# Patient Record
Sex: FEMALE | URBAN - METROPOLITAN AREA
[De-identification: names, ages, dates, MRNs, and addresses within clinical notes are randomized per-mention and may not be internally consistent; named-entity substitution may affect disease eponyms.]

---

## 2020-12-02 ENCOUNTER — APPOINTMENT (RX ONLY)
Dept: URBAN - METROPOLITAN AREA CLINIC 174 | Facility: CLINIC | Age: 75
Setting detail: DERMATOLOGY
End: 2020-12-02

## 2020-12-02 DIAGNOSIS — L28.0 LICHEN SIMPLEX CHRONICUS: ICD-10-CM

## 2020-12-02 DIAGNOSIS — D69.2 OTHER NONTHROMBOCYTOPENIC PURPURA: ICD-10-CM

## 2020-12-02 PROCEDURE — ? PRESCRIPTION

## 2020-12-02 PROCEDURE — 99202 OFFICE O/P NEW SF 15 MIN: CPT

## 2020-12-02 PROCEDURE — ? COUNSELING

## 2020-12-02 RX ORDER — CLOBETASOL PROPIONATE 0.5 MG/G
1 CREAM TOPICAL BID
Qty: 1 | Refills: 1 | Status: ERX

## 2020-12-02 ASSESSMENT — LOCATION DETAILED DESCRIPTION DERM
LOCATION DETAILED: MID POSTERIOR NECK
LOCATION DETAILED: LEFT MEDIAL BREAST 9-10:00 REGION

## 2020-12-02 ASSESSMENT — LOCATION ZONE DERM
LOCATION ZONE: NECK
LOCATION ZONE: TRUNK

## 2020-12-02 ASSESSMENT — LOCATION SIMPLE DESCRIPTION DERM
LOCATION SIMPLE: LEFT BREAST
LOCATION SIMPLE: POSTERIOR NECK

## 2020-12-02 NOTE — HPI: SKIN LESION
How Severe Is Your Skin Lesion?: mild
Has Your Skin Lesion Been Treated?: not been treated
Is This A New Presentation, Or A Follow-Up?: Skin Lesion
Additional History: Unsure if this is from fall she had 3 weeks ago.

## 2020-12-02 NOTE — HPI: RASH
How Severe Is Your Rash?: moderate
Is This A New Presentation, Or A Follow-Up?: Rash
Additional History: Patient had steroid injections, last one was 1 year ago (candice from [place])

## 2023-09-27 ENCOUNTER — APPOINTMENT (RX ONLY)
Dept: URBAN - METROPOLITAN AREA CLINIC 174 | Facility: CLINIC | Age: 78
Setting detail: DERMATOLOGY
End: 2023-09-27

## 2023-09-27 DIAGNOSIS — L29.89 OTHER PRURITUS: ICD-10-CM

## 2023-09-27 DIAGNOSIS — L0293 CARBUNCLE AND FURUNCLE OF UNSPECIFIED SITE: ICD-10-CM

## 2023-09-27 DIAGNOSIS — L0292 CARBUNCLE AND FURUNCLE OF UNSPECIFIED SITE: ICD-10-CM

## 2023-09-27 DIAGNOSIS — L29.8 OTHER PRURITUS: ICD-10-CM

## 2023-09-27 PROBLEM — L02.233 CARBUNCLE OF CHEST WALL: Status: ACTIVE | Noted: 2023-09-27

## 2023-09-27 PROCEDURE — 99213 OFFICE O/P EST LOW 20 MIN: CPT

## 2023-09-27 PROCEDURE — ? COUNSELING

## 2023-09-27 PROCEDURE — ? PRESCRIPTION

## 2023-09-27 PROCEDURE — ? PRESCRIPTION MEDICATION MANAGEMENT

## 2023-09-27 RX ORDER — DOXYCYCLINE HYCLATE 100 MG/1
1 CAPSULE, GELATIN COATED ORAL BID
Qty: 28 | Refills: 0 | Status: ERX | COMMUNITY
Start: 2023-09-27

## 2023-09-27 RX ADMIN — DOXYCYCLINE HYCLATE 1: 100 CAPSULE, GELATIN COATED ORAL at 00:00

## 2023-09-27 ASSESSMENT — LOCATION SIMPLE DESCRIPTION DERM
LOCATION SIMPLE: CHEST
LOCATION SIMPLE: RIGHT BREAST

## 2023-09-27 ASSESSMENT — LOCATION DETAILED DESCRIPTION DERM
LOCATION DETAILED: RIGHT PERIAREOLAR BREAST 11-12:00 REGION
LOCATION DETAILED: LEFT LATERAL SUPERIOR CHEST

## 2023-09-27 ASSESSMENT — LOCATION ZONE DERM: LOCATION ZONE: TRUNK

## 2023-09-27 ASSESSMENT — SEVERITY ASSESSMENT: SEVERITY: SEVERE

## 2023-09-27 NOTE — HPI: EVALUATION OF SKIN LESION(S)
What Type Of Note Output Would You Prefer (Optional)?: Standard Output
How Severe Are Your Spot(S)?: severe
Have Your Spot(S) Been Treated In The Past?: has been treated
Hpi Title: Evaluation of a Skin Lesion

## 2023-09-27 NOTE — PROCEDURE: PRESCRIPTION MEDICATION MANAGEMENT
Discontinue Regimen: Tacrolimus given by allergist
Plan: Can use a triple antibiotic if desired
Render In Strict Bullet Format?: No
Initiate Treatment: Doxycycline hyclate 100mg one pill twice a day
Detail Level: Zone
Plan: Patient can increase the antihistamine to 2 pills a day and use the tacrolimus to any itchy areas.

## 2023-10-25 ENCOUNTER — APPOINTMENT (RX ONLY)
Dept: URBAN - METROPOLITAN AREA CLINIC 174 | Facility: CLINIC | Age: 78
Setting detail: DERMATOLOGY
End: 2023-10-25

## 2023-10-25 DIAGNOSIS — L0292 CARBUNCLE AND FURUNCLE OF UNSPECIFIED SITE: ICD-10-CM | Status: IMPROVED

## 2023-10-25 DIAGNOSIS — L0293 CARBUNCLE AND FURUNCLE OF UNSPECIFIED SITE: ICD-10-CM | Status: IMPROVED

## 2023-10-25 DIAGNOSIS — L03.01 CELLULITIS OF FINGER: ICD-10-CM

## 2023-10-25 DIAGNOSIS — L29.89 OTHER PRURITUS: ICD-10-CM | Status: IMPROVED

## 2023-10-25 DIAGNOSIS — L29.8 OTHER PRURITUS: ICD-10-CM | Status: IMPROVED

## 2023-10-25 DIAGNOSIS — L57.8 OTHER SKIN CHANGES DUE TO CHRONIC EXPOSURE TO NONIONIZING RADIATION: ICD-10-CM

## 2023-10-25 PROBLEM — L03.012 CELLULITIS OF LEFT FINGER: Status: ACTIVE | Noted: 2023-10-25

## 2023-10-25 PROBLEM — L03.011 CELLULITIS OF RIGHT FINGER: Status: ACTIVE | Noted: 2023-10-25

## 2023-10-25 PROBLEM — L02.233 CARBUNCLE OF CHEST WALL: Status: ACTIVE | Noted: 2023-10-25

## 2023-10-25 PROCEDURE — ? PRESCRIPTION

## 2023-10-25 PROCEDURE — ? PRESCRIPTION MEDICATION MANAGEMENT

## 2023-10-25 PROCEDURE — 99213 OFFICE O/P EST LOW 20 MIN: CPT

## 2023-10-25 PROCEDURE — ? COUNSELING

## 2023-10-25 RX ORDER — MUPIROCIN 20 MG/G
OINTMENT TOPICAL
Qty: 22 | Refills: 1 | Status: ERX | COMMUNITY
Start: 2023-10-25

## 2023-10-25 RX ADMIN — MUPIROCIN: 20 OINTMENT TOPICAL at 00:00

## 2023-10-25 ASSESSMENT — LOCATION SIMPLE DESCRIPTION DERM
LOCATION SIMPLE: RIGHT THUMBNAIL
LOCATION SIMPLE: CHEST
LOCATION SIMPLE: LEFT MIDDLE FINGERNAIL
LOCATION SIMPLE: LEFT INDEX FINGER
LOCATION SIMPLE: RIGHT BREAST

## 2023-10-25 ASSESSMENT — LOCATION ZONE DERM
LOCATION ZONE: FINGER
LOCATION ZONE: FINGERNAIL
LOCATION ZONE: TRUNK

## 2023-10-25 ASSESSMENT — LOCATION DETAILED DESCRIPTION DERM
LOCATION DETAILED: RIGHT PERIAREOLAR BREAST 11-12:00 REGION
LOCATION DETAILED: RIGHT THUMBNAIL
LOCATION DETAILED: LEFT LATERAL SUPERIOR CHEST
LOCATION DETAILED: LEFT MIDDLE FINGERNAIL
LOCATION DETAILED: PERIUNGUAL SKIN LEFT INDEX FINGER

## 2023-10-25 NOTE — PROCEDURE: PRESCRIPTION MEDICATION MANAGEMENT
Discontinue Regimen: Doxycycline no longer needed.
Plan: Can use a triple antibiotic if desired
Render In Strict Bullet Format?: No
Initiate Treatment: Mupirocin 2% topical pint once daily to affected area.
Detail Level: Zone
Plan: Patient can increase the antihistamine to 2 pills a day and use the tacrolimus to any itchy areas.
Initiate Treatment: Apply mupirocin 2% topical ointment once daily as directed.

## 2023-11-08 ENCOUNTER — APPOINTMENT (RX ONLY)
Dept: URBAN - METROPOLITAN AREA CLINIC 174 | Facility: CLINIC | Age: 78
Setting detail: DERMATOLOGY
End: 2023-11-08

## 2023-11-08 DIAGNOSIS — L29.89 OTHER PRURITUS: ICD-10-CM | Status: IMPROVED

## 2023-11-08 DIAGNOSIS — L57.8 OTHER SKIN CHANGES DUE TO CHRONIC EXPOSURE TO NONIONIZING RADIATION: ICD-10-CM

## 2023-11-08 DIAGNOSIS — L60.8 OTHER NAIL DISORDERS: ICD-10-CM

## 2023-11-08 DIAGNOSIS — L29.8 OTHER PRURITUS: ICD-10-CM | Status: IMPROVED

## 2023-11-08 DIAGNOSIS — L03.01 CELLULITIS OF FINGER: ICD-10-CM

## 2023-11-08 DIAGNOSIS — L0292 CARBUNCLE AND FURUNCLE OF UNSPECIFIED SITE: ICD-10-CM | Status: IMPROVED

## 2023-11-08 DIAGNOSIS — L0293 CARBUNCLE AND FURUNCLE OF UNSPECIFIED SITE: ICD-10-CM | Status: IMPROVED

## 2023-11-08 PROBLEM — L03.011 CELLULITIS OF RIGHT FINGER: Status: ACTIVE | Noted: 2023-11-08

## 2023-11-08 PROBLEM — L02.233 CARBUNCLE OF CHEST WALL: Status: ACTIVE | Noted: 2023-11-08

## 2023-11-08 PROBLEM — L03.012 CELLULITIS OF LEFT FINGER: Status: ACTIVE | Noted: 2023-11-08

## 2023-11-08 PROCEDURE — ? PRESCRIPTION

## 2023-11-08 PROCEDURE — ? COUNSELING

## 2023-11-08 PROCEDURE — ? PRESCRIPTION MEDICATION MANAGEMENT

## 2023-11-08 PROCEDURE — 99213 OFFICE O/P EST LOW 20 MIN: CPT

## 2023-11-08 RX ORDER — DOXYCYCLINE HYCLATE 100 MG/1
1 CAPSULE, GELATIN COATED ORAL BID
Qty: 20 | Refills: 0 | Status: ERX

## 2023-11-08 RX ORDER — HYDROCORTISONE, IODOQUINOL 10; 10 MG/G; MG/G
1 CREAM TOPICAL QD
Qty: 28.4 | Refills: 3 | Status: ERX | COMMUNITY
Start: 2023-11-08

## 2023-11-08 RX ADMIN — HYDROCORTISONE, IODOQUINOL 1: 10; 10 CREAM TOPICAL at 00:00

## 2023-11-08 ASSESSMENT — LOCATION ZONE DERM
LOCATION ZONE: TRUNK
LOCATION ZONE: FINGER
LOCATION ZONE: FINGERNAIL

## 2023-11-08 ASSESSMENT — LOCATION DETAILED DESCRIPTION DERM
LOCATION DETAILED: RIGHT PERIAREOLAR BREAST 11-12:00 REGION
LOCATION DETAILED: LEFT LATERAL SUPERIOR CHEST
LOCATION DETAILED: PERIUNGUAL SKIN LEFT INDEX FINGER
LOCATION DETAILED: LEFT MIDDLE FINGERNAIL
LOCATION DETAILED: RIGHT THUMBNAIL

## 2023-11-08 ASSESSMENT — LOCATION SIMPLE DESCRIPTION DERM
LOCATION SIMPLE: RIGHT BREAST
LOCATION SIMPLE: LEFT INDEX FINGER
LOCATION SIMPLE: RIGHT THUMBNAIL
LOCATION SIMPLE: CHEST
LOCATION SIMPLE: LEFT MIDDLE FINGERNAIL

## 2023-11-08 NOTE — PROCEDURE: PRESCRIPTION MEDICATION MANAGEMENT
Discontinue Regimen: Doxycycline no longer needed.
Render In Strict Bullet Format?: No
Initiate Treatment: Mupirocin 2% topical once daily to affected area.
Detail Level: Zone
Initiate Treatment: Hydrocortisone-iodoquinol once a day
Continue Regimen: Apply mupirocin 2% topical ointment once daily as directed.
Plan: Patient can increase the antihistamine to 2 pills a day and use the tacrolimus to any itchy areas.
Plan: Discussed vinegar soaks but pt states she is severely allergic\\n\\nDiscussed this could be due to her hospital stay in February
Initiate Treatment: Doxycycline hyclate 100mg BID for 10 days

## 2023-12-06 ENCOUNTER — APPOINTMENT (RX ONLY)
Dept: URBAN - METROPOLITAN AREA CLINIC 174 | Facility: CLINIC | Age: 78
Setting detail: DERMATOLOGY
End: 2023-12-06

## 2023-12-06 DIAGNOSIS — L60.8 OTHER NAIL DISORDERS: ICD-10-CM

## 2023-12-06 DIAGNOSIS — L0293 CARBUNCLE AND FURUNCLE OF UNSPECIFIED SITE: ICD-10-CM | Status: RESOLVING

## 2023-12-06 DIAGNOSIS — L03.01 CELLULITIS OF FINGER: ICD-10-CM

## 2023-12-06 DIAGNOSIS — L0292 CARBUNCLE AND FURUNCLE OF UNSPECIFIED SITE: ICD-10-CM | Status: RESOLVING

## 2023-12-06 PROBLEM — L03.011 CELLULITIS OF RIGHT FINGER: Status: ACTIVE | Noted: 2023-12-06

## 2023-12-06 PROBLEM — L03.012 CELLULITIS OF LEFT FINGER: Status: ACTIVE | Noted: 2023-12-06

## 2023-12-06 PROBLEM — L02.233 CARBUNCLE OF CHEST WALL: Status: ACTIVE | Noted: 2023-12-06

## 2023-12-06 PROCEDURE — ? PRESCRIPTION

## 2023-12-06 PROCEDURE — ? ORDER TESTS

## 2023-12-06 PROCEDURE — ? COUNSELING

## 2023-12-06 PROCEDURE — ? PRESCRIPTION MEDICATION MANAGEMENT

## 2023-12-06 PROCEDURE — 99213 OFFICE O/P EST LOW 20 MIN: CPT

## 2023-12-06 RX ORDER — CLOBETASOL PROPIONATE 0.5 MG/G
1 CREAM TOPICAL AS DIRECTED
Qty: 60 | Refills: 1 | Status: ERX | COMMUNITY
Start: 2023-12-06

## 2023-12-06 RX ADMIN — CLOBETASOL PROPIONATE 1: 0.5 CREAM TOPICAL at 00:00

## 2023-12-06 ASSESSMENT — LOCATION DETAILED DESCRIPTION DERM
LOCATION DETAILED: RIGHT PERIAREOLAR BREAST 11-12:00 REGION
LOCATION DETAILED: LEFT MIDDLE FINGERNAIL
LOCATION DETAILED: RIGHT THUMBNAIL
LOCATION DETAILED: PERIUNGUAL SKIN LEFT INDEX FINGER

## 2023-12-06 ASSESSMENT — LOCATION ZONE DERM
LOCATION ZONE: FINGERNAIL
LOCATION ZONE: TRUNK
LOCATION ZONE: FINGER

## 2023-12-06 ASSESSMENT — LOCATION SIMPLE DESCRIPTION DERM
LOCATION SIMPLE: RIGHT THUMBNAIL
LOCATION SIMPLE: LEFT INDEX FINGER
LOCATION SIMPLE: RIGHT BREAST
LOCATION SIMPLE: LEFT MIDDLE FINGERNAIL

## 2023-12-06 NOTE — PROCEDURE: ORDER TESTS
Bill For Surgical Tray: no
Billing Type: Third-Party Bill
Expected Date Of Service: 12/06/2023
Performing Laboratory: [id number]
Lab Facility: [id number]

## 2023-12-06 NOTE — PROCEDURE: PRESCRIPTION MEDICATION MANAGEMENT
Plan: Discussed vinegar soaks but pt states she is severely allergic\\n\\nDiscussed this could be due to her hospital stay in February
Detail Level: Zone
Render In Strict Bullet Format?: No
Plan: Hydrocortisone-iodoquinol once a day didn’t help so will try anti-inflammatory cream.  She says she is allergic to vinegar so cannot do vinegar soaks.  She states nail issue (3 nails) started after long-term doxycycline, but I cannot diagnose photo-onycholysis when she barely goes outside.
Initiate Treatment: Clobetasol cream once daily
Discontinue Regimen: Doxycycline no longer needed.
Continue Regimen: Mupirocin 2% topical once daily to affected area.

## 2023-12-19 ENCOUNTER — APPOINTMENT (RX ONLY)
Dept: URBAN - METROPOLITAN AREA CLINIC 158 | Facility: CLINIC | Age: 78
Setting detail: DERMATOLOGY
End: 2023-12-19

## 2023-12-19 DIAGNOSIS — L03.01 CELLULITIS OF FINGER: ICD-10-CM | Status: INADEQUATELY CONTROLLED

## 2023-12-19 PROBLEM — L03.011 CELLULITIS OF RIGHT FINGER: Status: ACTIVE | Noted: 2023-12-19

## 2023-12-19 PROBLEM — L03.012 CELLULITIS OF LEFT FINGER: Status: ACTIVE | Noted: 2023-12-19

## 2023-12-19 PROCEDURE — ? COUNSELING

## 2023-12-19 PROCEDURE — 99214 OFFICE O/P EST MOD 30 MIN: CPT

## 2023-12-19 PROCEDURE — ? PATIENT SPECIFIC COUNSELING

## 2023-12-19 PROCEDURE — ? NAIL CLIPPING FOR PATHOLOGY

## 2023-12-19 PROCEDURE — ? PRESCRIPTION

## 2023-12-19 RX ORDER — DOXYCYCLINE HYCLATE 100 MG/1
1 CAPSULE, GELATIN COATED ORAL BID
Qty: 28 | Refills: 0 | Status: ERX | COMMUNITY
Start: 2023-12-19

## 2023-12-19 RX ORDER — MUPIROCIN 20 MG/G
1 OINTMENT TOPICAL BID
Qty: 22 | Refills: 3 | Status: ERX

## 2023-12-19 RX ADMIN — DOXYCYCLINE HYCLATE 1: 100 CAPSULE, GELATIN COATED ORAL at 00:00

## 2023-12-19 ASSESSMENT — LOCATION DETAILED DESCRIPTION DERM
LOCATION DETAILED: RIGHT THUMBNAIL
LOCATION DETAILED: LEFT THUMBNAIL
LOCATION DETAILED: LEFT INDEX FINGERNAIL

## 2023-12-19 ASSESSMENT — LOCATION SIMPLE DESCRIPTION DERM
LOCATION SIMPLE: LEFT THUMBNAIL
LOCATION SIMPLE: LEFT INDEX FINGERNAIL
LOCATION SIMPLE: RIGHT THUMBNAIL

## 2023-12-19 ASSESSMENT — LOCATION ZONE DERM: LOCATION ZONE: FINGERNAIL

## 2023-12-19 NOTE — PROCEDURE: NAIL CLIPPING FOR PATHOLOGY
Body Location Override (Optional - Billing Will Still Be Based On Selected Body Map Location If Applicable): right thumbnail, left thumbnail, and left index finger
Detail Level: Detailed
Lab: [id number]
Render Path Notes In Note?: No
Billing Type: Third-Party Bill
Lab Facility: [id number]

## 2023-12-19 NOTE — PROCEDURE: PATIENT SPECIFIC COUNSELING
Detail Level: Simple
The patient had seen Dr. Dominguez several times for her nails.  She has had this issue for at least 2 months. Her left index finger is extremely sore (10/10 on pain scale). She was prescribed mupirocin 2% ointment on 10/25/2023, doxycycline 100mg BID for 10 days and hydrocortisone 1%-iodoquinol 1% cream on 11/8/2023, and clobetasol 0.05% cream on 12/6/2023.  A culture was taken on 12/6/2023 which grew heavy growth of mixed gram-positive and negative juan c (probable contamination or colonization with cutaneous juan c per culture report). She was not notified of the results. She had used clobetasol cream for only 2 days after the last visit but discontinued the medication because it wasn't helping.  She is currently using a combination of \"prednisone 1% cream\" and mupirocin.  I told her that I am not aware of any \"prednisone cream\" that is commercially available; she states that she will bring in the medication to her next visit. Exam shows dystophic nail on the right thumb, left thumb, and left index with erythema and swelling of the nail folds.  Differential diagnosis include bacterial paronychia, fungal paronychia, nail psoriasis, and nail lichen planus.  Nail clipping was taken today for both pathology and [employer] Nail-ID test.  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  I recommend restarting doxycycline 100mg BID for 2 weeks; will adjust oral antibiotic (or oral antifungal) based upon the test results.  I also recommend that she go back to try clobetasol cream with mupirocin ointment BID.  Return to clinic in 2 weeks.

## 2023-12-21 ENCOUNTER — RX ONLY (OUTPATIENT)
Age: 78
Setting detail: RX ONLY
End: 2023-12-21

## 2023-12-21 RX ORDER — ECONAZOLE NITRATE 10 MG/G
1 CREAM TOPICAL BID
Qty: 30 | Refills: 3 | Status: CANCELLED

## 2023-12-21 RX ORDER — KETOCONAZOLE 20 MG/G
1 CREAM TOPICAL BID
Qty: 30 | Refills: 3 | Status: CANCELLED

## 2023-12-21 RX ORDER — FLUCONAZOLE 200 MG/1
1 TABLET ORAL QD
Qty: 30 | Refills: 0 | Status: CANCELLED

## 2023-12-21 RX ORDER — FLUCONAZOLE 100 MG/1
1 TABLET ORAL QD
Qty: 30 | Refills: 0 | Status: ERX | COMMUNITY
Start: 2023-12-21

## 2023-12-21 RX ORDER — CICLOPIROX OLAMINE 7.7 MG/G
1 CREAM TOPICAL BID
Qty: 30 | Refills: 3 | Status: ERX | COMMUNITY
Start: 2023-12-21

## 2024-01-04 ENCOUNTER — APPOINTMENT (RX ONLY)
Dept: URBAN - METROPOLITAN AREA CLINIC 158 | Facility: CLINIC | Age: 79
Setting detail: DERMATOLOGY
End: 2024-01-04

## 2024-01-04 DIAGNOSIS — L03.01 CELLULITIS OF FINGER: ICD-10-CM | Status: IMPROVED

## 2024-01-04 PROBLEM — L03.012 CELLULITIS OF LEFT FINGER: Status: ACTIVE | Noted: 2024-01-04

## 2024-01-04 PROBLEM — L03.011 CELLULITIS OF RIGHT FINGER: Status: ACTIVE | Noted: 2024-01-04

## 2024-01-04 PROCEDURE — ? PATIENT SPECIFIC COUNSELING

## 2024-01-04 PROCEDURE — ? COUNSELING

## 2024-01-04 PROCEDURE — 99213 OFFICE O/P EST LOW 20 MIN: CPT

## 2024-01-04 PROCEDURE — ? PRESCRIPTION MEDICATION MANAGEMENT

## 2024-01-04 ASSESSMENT — LOCATION SIMPLE DESCRIPTION DERM
LOCATION SIMPLE: RIGHT THUMBNAIL
LOCATION SIMPLE: LEFT THUMBNAIL
LOCATION SIMPLE: LEFT INDEX FINGERNAIL

## 2024-01-04 ASSESSMENT — LOCATION DETAILED DESCRIPTION DERM
LOCATION DETAILED: RIGHT THUMBNAIL
LOCATION DETAILED: LEFT INDEX FINGERNAIL
LOCATION DETAILED: LEFT THUMBNAIL

## 2024-01-04 ASSESSMENT — LOCATION ZONE DERM: LOCATION ZONE: FINGERNAIL

## 2024-01-04 NOTE — PROCEDURE: PATIENT SPECIFIC COUNSELING
Detail Level: Simple
Carry forward from 12/19/2023: The patient had seen Dr. Dominguez several times for her nails.  She has had this issue for at least 2 months. Her left index finger is extremely sore (10/10 on pain scale). She was prescribed mupirocin 2% ointment on 10/25/2023, doxycycline 100mg BID for 10 days and hydrocortisone 1%-iodoquinol 1% cream on 11/8/2023, and clobetasol 0.05% cream on 12/6/2023.  A culture was taken on 12/6/2023 which grew heavy growth of mixed gram-positive and negative juan c (probable contamination or colonization with cutaneous juan c per culture report). She was not notified of the results. She had used clobetasol cream for only 2 days after the last visit but discontinued the medication because it wasn't helping.  She is currently using a combination of \"prednisone 1% cream\" and mupirocin.  I told her that I am not aware of any \"prednisone cream\" that is commercially available; she states that she will bring in the medication to her next visit. Exam shows dystophic nail on the right thumb, left thumb, and left index with erythema and swelling of the nail folds.  Differential diagnosis include bacterial paronychia, fungal paronychia, nail psoriasis, and nail lichen planus.  Nail clipping was taken today for both pathology and [employer] Nail-ID test.  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  I recommend restarting doxycycline 100mg BID for 2 weeks; will adjust oral antibiotic (or oral antifungal) based upon the test results.  I also recommend that she go back to try clobetasol cream with mupirocin ointment BID.  Return to clinic in 2 weeks.\\n\\nTODAY's note (Jan 04 2024): Nail clipping sent for pathology at the last visit showed onychomycosis with numerous fungal organism and Nail Fungal ID molecular pathogen test detected Candida albicans (91%) and malassezia spp. (9%).  I had notified the patient of the results of the Nail Fungal ID test on 12/21/2023 and prescribed her fluconazole 100mg QD (#30 with no refills) and ciclopirox 0.77% cream.   I also instructed her to discontinue doxycycline and clobetasol cream.  Condition is vastly improved; see photo. I will have her continue on fluconazole 100mg QD adn ciclopirox 0.77% cream QOD.  Return to clinic in 2 weeks. We may need to extend the course of fluconazole at the next visit.

## 2024-01-04 NOTE — PROCEDURE: PRESCRIPTION MEDICATION MANAGEMENT
Render In Strict Bullet Format?: No
Detail Level: Simple
Continue Regimen: fluconazole 100mg QD and ciclopirox 0.77% cream QOD

## 2024-01-16 ENCOUNTER — APPOINTMENT (RX ONLY)
Dept: URBAN - METROPOLITAN AREA CLINIC 158 | Facility: CLINIC | Age: 79
Setting detail: DERMATOLOGY
End: 2024-01-16

## 2024-01-16 ENCOUNTER — RX ONLY (OUTPATIENT)
Age: 79
Setting detail: RX ONLY
End: 2024-01-16

## 2024-01-16 DIAGNOSIS — L29.89 OTHER PRURITUS: ICD-10-CM

## 2024-01-16 DIAGNOSIS — L29.8 OTHER PRURITUS: ICD-10-CM

## 2024-01-16 DIAGNOSIS — L03.01 CELLULITIS OF FINGER: ICD-10-CM

## 2024-01-16 PROBLEM — L03.012 CELLULITIS OF LEFT FINGER: Status: ACTIVE | Noted: 2024-01-16

## 2024-01-16 PROBLEM — L03.011 CELLULITIS OF RIGHT FINGER: Status: ACTIVE | Noted: 2024-01-16

## 2024-01-16 PROCEDURE — ? PRESCRIPTION

## 2024-01-16 PROCEDURE — ? PRESCRIPTION MEDICATION MANAGEMENT

## 2024-01-16 PROCEDURE — ? COUNSELING

## 2024-01-16 PROCEDURE — ? PATIENT SPECIFIC COUNSELING

## 2024-01-16 PROCEDURE — 99213 OFFICE O/P EST LOW 20 MIN: CPT

## 2024-01-16 RX ORDER — CLOBETASOL PROPIONATE 0.5 MG/G
1 CREAM TOPICAL AS DIRECTED
Qty: 60 | Refills: 1 | Status: ERX

## 2024-01-16 RX ORDER — FLUCONAZOLE 100 MG/1
1 TABLET ORAL QD
Qty: 30 | Refills: 0 | Status: ERX

## 2024-01-16 RX ORDER — CLOBETASOL PROPIONATE 0.5 MG/G
1 CREAM TOPICAL QOD
Qty: 60 | Refills: 3 | Status: ERX

## 2024-01-16 ASSESSMENT — LOCATION SIMPLE DESCRIPTION DERM
LOCATION SIMPLE: LEFT THUMBNAIL
LOCATION SIMPLE: LEFT INDEX FINGERNAIL
LOCATION SIMPLE: RIGHT THUMBNAIL
LOCATION SIMPLE: LEFT UPPER ARM

## 2024-01-16 ASSESSMENT — LOCATION DETAILED DESCRIPTION DERM
LOCATION DETAILED: LEFT THUMBNAIL
LOCATION DETAILED: RIGHT THUMBNAIL
LOCATION DETAILED: LEFT ANTERIOR DISTAL UPPER ARM
LOCATION DETAILED: LEFT INDEX FINGERNAIL

## 2024-01-16 ASSESSMENT — LOCATION ZONE DERM
LOCATION ZONE: FINGERNAIL
LOCATION ZONE: ARM

## 2024-01-16 NOTE — PROCEDURE: COUNSELING
Detail Level: Detailed
Patient Specific Counseling (Will Not Stick From Patient To Patient): Recommend Dermeleve cream.

## 2024-01-16 NOTE — PROCEDURE: PRESCRIPTION MEDICATION MANAGEMENT
Render In Strict Bullet Format?: No
Detail Level: Simple
Continue Regimen: fluconazole 100mg QD and ciclopirox 0.77% cream QOD
Initiate Treatment: clobetasol 0.05% cream QOD

## 2024-01-16 NOTE — PROCEDURE: PATIENT SPECIFIC COUNSELING
Detail Level: Simple
Carry forward from 12/19/2023: The patient had seen Dr. Dominguez several times for her nails.  She has had this issue for at least 2 months. Her left index finger is extremely sore (10/10 on pain scale). She was prescribed mupirocin 2% ointment on 10/25/2023, doxycycline 100mg BID for 10 days and hydrocortisone 1%-iodoquinol 1% cream on 11/8/2023, and clobetasol 0.05% cream on 12/6/2023.  A culture was taken on 12/6/2023 which grew heavy growth of mixed gram-positive and negative juan c (probable contamination or colonization with cutaneous juan c per culture report). She was not notified of the results. She had used clobetasol cream for only 2 days after the last visit but discontinued the medication because it wasn't helping.  She is currently using a combination of \"prednisone 1% cream\" and mupirocin.  I told her that I am not aware of any \"prednisone cream\" that is commercially available; she states that she will bring in the medication to her next visit. Exam shows dystophic nail on the right thumb, left thumb, and left index with erythema and swelling of the nail folds.  Differential diagnosis include bacterial paronychia, fungal paronychia, nail psoriasis, and nail lichen planus.  Nail clipping was taken today for both pathology and [employer] Nail-ID test.  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  I recommend restarting doxycycline 100mg BID for 2 weeks; will adjust oral antibiotic (or oral antifungal) based upon the test results.  I also recommend that she go back to try clobetasol cream with mupirocin ointment BID.  Return to clinic in 2 weeks.\\n\\nCarry forward from (Jan 04 2024): Nail clipping sent for pathology at the last visit showed onychomycosis with numerous fungal organism and Nail Fungal ID molecular pathogen test detected Candida albicans (91%) and malassezia spp. (9%).  I had notified the patient of the results of the Nail Fungal ID test on 12/21/2023 and prescribed her fluconazole 100mg QD (#30 with no refills) and ciclopirox 0.77% cream.   I also instructed her to discontinue doxycycline and clobetasol cream.  Condition is vastly improved; see photo. I will have her continue on fluconazole 100mg QD adn ciclopirox 0.77% cream QOD.  Return to clinic in 2 weeks. We may need to extend the course of fluconazole at the next visit.\\n\\nTBRITTA's note (Jan 16 2024): Candidal onychomycosis and paronychia is improved but her fingers are . I recommend extending fluconazole 100mg QD for 1 more month. She should continue the use of ciclopirox cream QOD and I will have her add back clobetasol 0.05% cream QOD which can be applied together.  Return to clinic in 1 month.

## 2024-02-14 ENCOUNTER — APPOINTMENT (RX ONLY)
Dept: URBAN - METROPOLITAN AREA CLINIC 158 | Facility: CLINIC | Age: 79
Setting detail: DERMATOLOGY
End: 2024-02-14

## 2024-02-14 DIAGNOSIS — L03.01 CELLULITIS OF FINGER: ICD-10-CM

## 2024-02-14 PROBLEM — L03.012 CELLULITIS OF LEFT FINGER: Status: ACTIVE | Noted: 2024-02-14

## 2024-02-14 PROBLEM — L03.011 CELLULITIS OF RIGHT FINGER: Status: ACTIVE | Noted: 2024-02-14

## 2024-02-14 PROCEDURE — 99213 OFFICE O/P EST LOW 20 MIN: CPT

## 2024-02-14 PROCEDURE — ? PATIENT SPECIFIC COUNSELING

## 2024-02-14 PROCEDURE — ? PRESCRIPTION MEDICATION MANAGEMENT

## 2024-02-14 PROCEDURE — ? PRESCRIPTION

## 2024-02-14 PROCEDURE — ? COUNSELING

## 2024-02-14 RX ORDER — FLUCONAZOLE 100 MG/1
1 TABLET ORAL QD
Qty: 30 | Refills: 0 | Status: ERX

## 2024-02-14 ASSESSMENT — LOCATION DETAILED DESCRIPTION DERM
LOCATION DETAILED: LEFT INDEX FINGERNAIL
LOCATION DETAILED: RIGHT THUMBNAIL
LOCATION DETAILED: LEFT THUMBNAIL

## 2024-02-14 ASSESSMENT — LOCATION ZONE DERM: LOCATION ZONE: FINGERNAIL

## 2024-02-14 NOTE — PROCEDURE: PATIENT SPECIFIC COUNSELING
Detail Level: Simple
Carry forward from 12/19/2023: The patient had seen Dr. Dominguez several times for her nails.  She has had this issue for at least 2 months. Her left index finger is extremely sore (10/10 on pain scale). She was prescribed mupirocin 2% ointment on 10/25/2023, doxycycline 100mg BID for 10 days and hydrocortisone 1%-iodoquinol 1% cream on 11/8/2023, and clobetasol 0.05% cream on 12/6/2023.  A culture was taken on 12/6/2023 which grew heavy growth of mixed gram-positive and negative juan c (probable contamination or colonization with cutaneous juan c per culture report). She was not notified of the results. She had used clobetasol cream for only 2 days after the last visit but discontinued the medication because it wasn't helping.  She is currently using a combination of \"prednisone 1% cream\" and mupirocin.  I told her that I am not aware of any \"prednisone cream\" that is commercially available; she states that she will bring in the medication to her next visit. Exam shows dystophic nail on the right thumb, left thumb, and left index with erythema and swelling of the nail folds.  Differential diagnosis include bacterial paronychia, fungal paronychia, nail psoriasis, and nail lichen planus.  Nail clipping was taken today for both pathology and [employer] Nail-ID test.  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  I recommend restarting doxycycline 100mg BID for 2 weeks; will adjust oral antibiotic (or oral antifungal) based upon the test results.  I also recommend that she go back to try clobetasol cream with mupirocin ointment BID.  Return to clinic in 2 weeks.\\n\\nCarry forward from (Jan 04 2024): Nail clipping sent for pathology at the last visit showed onychomycosis with numerous fungal organism and Nail Fungal ID molecular pathogen test detected Candida albicans (91%) and malassezia spp. (9%).  I had notified the patient of the results of the Nail Fungal ID test on 12/21/2023 and prescribed her fluconazole 100mg QD (#30 with no refills) and ciclopirox 0.77% cream.   I also instructed her to discontinue doxycycline and clobetasol cream.  Condition is vastly improved; see photo. I will have her continue on fluconazole 100mg QD adn ciclopirox 0.77% cream QOD.  Return to clinic in 2 weeks. We may need to extend the course of fluconazole at the next visit.\\n\\nCarry forward from (Jan 16 2024): Candidal onychomycosis and paronychia is improved but her fingers are . I recommend extending fluconazole 100mg QD for 1 more month. She should continue the use of ciclopirox cream QOD and I will have her add back clobetasol 0.05% cream QOD which can be applied together.  Return to clinic in 1 month.\\n\\nTODAY's note (Feb 14 2024): Candidal onychomycosis and paronychia continue to improve but her fingers are . The left thumb looks almost normal with minimal erythema of the nail folds. The right thumb and left index sill have erythema over the nail folds and the nails are still dystrophic. I trimmed the nail on the left index finger. I will have her decrease fluconazole to 100mg weekly. She can continue the use of ciclopirox cream alternating with clobetasol cream QOD.  She can also try Dermeleve cream for the pain and tenderness.  Part of the issue is that she always has these fingers covered with bandaids because of the tenderness and her concern about contagiousness; this creates a moist environment for yeast overgrowth. She has a nurse come in three times a week for bandaid changes. I recommend that she take off the bandaid the night before each dressing change to let air get to her fingers. Return to clinic in 1 month. Refill given for fluconazole which is written as daily but the patient knows to decrease the dose to weekly.

## 2024-02-14 NOTE — PROCEDURE: PRESCRIPTION MEDICATION MANAGEMENT
Render In Strict Bullet Format?: No
Detail Level: Simple
Continue Regimen: clobetasol cream QOD with ciclopirox 0.77% cream QOD
Modify Regimen: Decrease fluconazole to 100mg weekly

## 2024-03-13 ENCOUNTER — APPOINTMENT (RX ONLY)
Dept: URBAN - METROPOLITAN AREA CLINIC 158 | Facility: CLINIC | Age: 79
Setting detail: DERMATOLOGY
End: 2024-03-13

## 2024-03-13 DIAGNOSIS — L03.01 CELLULITIS OF FINGER: ICD-10-CM

## 2024-03-13 PROBLEM — L03.011 CELLULITIS OF RIGHT FINGER: Status: ACTIVE | Noted: 2024-03-13

## 2024-03-13 PROBLEM — L03.012 CELLULITIS OF LEFT FINGER: Status: ACTIVE | Noted: 2024-03-13

## 2024-03-13 PROCEDURE — 99213 OFFICE O/P EST LOW 20 MIN: CPT

## 2024-03-13 PROCEDURE — ? PRESCRIPTION

## 2024-03-13 PROCEDURE — ? PRESCRIPTION MEDICATION MANAGEMENT

## 2024-03-13 PROCEDURE — ? COUNSELING

## 2024-03-13 PROCEDURE — ? PATIENT SPECIFIC COUNSELING

## 2024-03-13 RX ORDER — FLUCONAZOLE 100 MG/1
1 TABLET ORAL QD
Qty: 30 | Refills: 0 | Status: ERX

## 2024-03-13 ASSESSMENT — LOCATION SIMPLE DESCRIPTION DERM
LOCATION SIMPLE: LEFT THUMBNAIL
LOCATION SIMPLE: RIGHT THUMBNAIL
LOCATION SIMPLE: LEFT INDEX FINGERNAIL

## 2024-03-13 ASSESSMENT — LOCATION ZONE DERM: LOCATION ZONE: FINGERNAIL

## 2024-03-13 NOTE — PROCEDURE: PATIENT SPECIFIC COUNSELING
Detail Level: Simple
Carry forward from 12/19/2023: The patient had seen Dr. Dominguez several times for her nails.  She has had this issue for at least 2 months. Her left index finger is extremely sore (10/10 on pain scale). She was prescribed mupirocin 2% ointment on 10/25/2023, doxycycline 100mg BID for 10 days and hydrocortisone 1%-iodoquinol 1% cream on 11/8/2023, and clobetasol 0.05% cream on 12/6/2023.  A culture was taken on 12/6/2023 which grew heavy growth of mixed gram-positive and negative juan c (probable contamination or colonization with cutaneous juan c per culture report). She was not notified of the results. She had used clobetasol cream for only 2 days after the last visit but discontinued the medication because it wasn't helping.  She is currently using a combination of \"prednisone 1% cream\" and mupirocin.  I told her that I am not aware of any \"prednisone cream\" that is commercially available; she states that she will bring in the medication to her next visit. Exam shows dystophic nail on the right thumb, left thumb, and left index with erythema and swelling of the nail folds.  Differential diagnosis include bacterial paronychia, fungal paronychia, nail psoriasis, and nail lichen planus.  Nail clipping was taken today for both pathology and [employer] Nail-ID test.  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  I recommend restarting doxycycline 100mg BID for 2 weeks; will adjust oral antibiotic (or oral antifungal) based upon the test results.  I also recommend that she go back to try clobetasol cream with mupirocin ointment BID.  Return to clinic in 2 weeks.\\n\\nCarry forward from (Jan 04 2024): Nail clipping sent for pathology at the last visit showed onychomycosis with numerous fungal organism and Nail Fungal ID molecular pathogen test detected Candida albicans (91%) and malassezia spp. (9%).  I had notified the patient of the results of the Nail Fungal ID test on 12/21/2023 and prescribed her fluconazole 100mg QD (#30 with no refills) and ciclopirox 0.77% cream.   I also instructed her to discontinue doxycycline and clobetasol cream.  Condition is vastly improved; see photo. I will have her continue on fluconazole 100mg QD adn ciclopirox 0.77% cream QOD.  Return to clinic in 2 weeks. We may need to extend the course of fluconazole at the next visit.\\n\\nCarry forward from (Jan 16 2024): Candidal onychomycosis and paronychia is improved but her fingers are . I recommend extending fluconazole 100mg QD for 1 more month. She should continue the use of ciclopirox cream QOD and I will have her add back clobetasol 0.05% cream QOD which can be applied together.  Return to clinic in 1 month.\\n\\nCarry forward from (Feb 14 2024): Candidal onychomycosis and paronychia continue to improve but her fingers are . The left thumb looks almost normal with minimal erythema of the nail folds. The right thumb and left index sill have erythema over the nail folds and the nails are still dystrophic. I trimmed the nail on the left index finger. I will have her decrease fluconazole to 100mg weekly. She can continue the use of ciclopirox cream alternating with clobetasol cream QOD.  She can also try Dermeleve cream for the pain and tenderness.  Part of the issue is that she always has these fingers covered with bandaids because of the tenderness and her concern about contagiousness; this creates a moist environment for yeast overgrowth. She has a nurse come in three times a week for bandaid changes. I recommend that she take off the bandaid the night before each dressing change to let air get to her fingers. Return to clinic in 1 month. Refill given for fluconazole which is written as daily but the patient knows to decrease the dose to weekly.\\n\\nTODAY's note (Mar 13 2024): Left thumb is almost normal and she does not need to bandage this finger anymore.  The right thumb shows signs of proximal clearing and the nail fold is not swollen.  The left index finger is still swollen and sore and the nail is still dystrophic. I will have her stay on fluconazole 100mg weekly. She states that she only has 3 pills of fluconazole left at home so she likely did not  the refill after the last visit; refills were given for fluconazole which is written as daily but the patient knows to decrease the dose to weekly.  She has a nurse come in three times a week for bandaid changes. I recommend that she take off the bandaid the night before each dressing change to let air get to her fingers. Return to clinic in 1 month.

## 2024-03-13 NOTE — PROCEDURE: PRESCRIPTION MEDICATION MANAGEMENT
Render In Strict Bullet Format?: No
Detail Level: Simple
Continue Regimen: fluconazole to 100mg weekly\\nclobetasol cream QOD with ciclopirox 0.77% cream QOD

## 2024-04-17 ENCOUNTER — APPOINTMENT (RX ONLY)
Dept: URBAN - METROPOLITAN AREA CLINIC 158 | Facility: CLINIC | Age: 79
Setting detail: DERMATOLOGY
End: 2024-04-17

## 2024-04-17 DIAGNOSIS — L03.01 CELLULITIS OF FINGER: ICD-10-CM

## 2024-04-17 PROBLEM — L03.012 CELLULITIS OF LEFT FINGER: Status: ACTIVE | Noted: 2024-04-17

## 2024-04-17 PROBLEM — L03.011 CELLULITIS OF RIGHT FINGER: Status: ACTIVE | Noted: 2024-04-17

## 2024-04-17 PROCEDURE — 99213 OFFICE O/P EST LOW 20 MIN: CPT

## 2024-04-17 PROCEDURE — ? PRESCRIPTION MEDICATION MANAGEMENT

## 2024-04-17 PROCEDURE — ? PATIENT SPECIFIC COUNSELING

## 2024-04-17 PROCEDURE — ? COUNSELING

## 2024-04-17 ASSESSMENT — LOCATION SIMPLE DESCRIPTION DERM
LOCATION SIMPLE: RIGHT THUMBNAIL
LOCATION SIMPLE: LEFT INDEX FINGERNAIL
LOCATION SIMPLE: LEFT THUMBNAIL

## 2024-04-17 ASSESSMENT — LOCATION ZONE DERM: LOCATION ZONE: FINGERNAIL

## 2024-04-17 NOTE — PROCEDURE: PRESCRIPTION MEDICATION MANAGEMENT
Render In Strict Bullet Format?: No
Detail Level: Simple
Continue Regimen: fluconazole to 100mg weekly\\nclobetasol cream QOD with ciclopirox 0.77% cream QOD
Initiate Treatment: Voltaren gel

## 2024-04-17 NOTE — PROCEDURE: PATIENT SPECIFIC COUNSELING
Detail Level: Simple
Carry forward from 12/19/2023: The patient had seen Dr. Dominguez several times for her nails.  She has had this issue for at least 2 months. Her left index finger is extremely sore (10/10 on pain scale). She was prescribed mupirocin 2% ointment on 10/25/2023, doxycycline 100mg BID for 10 days and hydrocortisone 1%-iodoquinol 1% cream on 11/8/2023, and clobetasol 0.05% cream on 12/6/2023.  A culture was taken on 12/6/2023 which grew heavy growth of mixed gram-positive and negative juan c (probable contamination or colonization with cutaneous juan c per culture report). She was not notified of the results. She had used clobetasol cream for only 2 days after the last visit but discontinued the medication because it wasn't helping.  She is currently using a combination of \"prednisone 1% cream\" and mupirocin.  I told her that I am not aware of any \"prednisone cream\" that is commercially available; she states that she will bring in the medication to her next visit. Exam shows dystophic nail on the right thumb, left thumb, and left index with erythema and swelling of the nail folds.  Differential diagnosis include bacterial paronychia, fungal paronychia, nail psoriasis, and nail lichen planus.  Nail clipping was taken today for both pathology and [employer] Nail-ID test.  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  I recommend restarting doxycycline 100mg BID for 2 weeks; will adjust oral antibiotic (or oral antifungal) based upon the test results.  I also recommend that she go back to try clobetasol cream with mupirocin ointment BID.  Return to clinic in 2 weeks.\\n\\nCarry forward from (Jan 04 2024): Nail clipping sent for pathology at the last visit showed onychomycosis with numerous fungal organism and Nail Fungal ID molecular pathogen test detected Candida albicans (91%) and malassezia spp. (9%).  I had notified the patient of the results of the Nail Fungal ID test on 12/21/2023 and prescribed her fluconazole 100mg QD (#30 with no refills) and ciclopirox 0.77% cream.   I also instructed her to discontinue doxycycline and clobetasol cream.  Condition is vastly improved; see photo. I will have her continue on fluconazole 100mg QD adn ciclopirox 0.77% cream QOD.  Return to clinic in 2 weeks. We may need to extend the course of fluconazole at the next visit.\\n\\nCarry forward from (Jan 16 2024): Candidal onychomycosis and paronychia is improved but her fingers are . I recommend extending fluconazole 100mg QD for 1 more month. She should continue the use of ciclopirox cream QOD and I will have her add back clobetasol 0.05% cream QOD which can be applied together.  Return to clinic in 1 month.\\n\\nCarry forward from (Feb 14 2024): Candidal onychomycosis and paronychia continue to improve but her fingers are . The left thumb looks almost normal with minimal erythema of the nail folds. The right thumb and left index sill have erythema over the nail folds and the nails are still dystrophic. I trimmed the nail on the left index finger. I will have her decrease fluconazole to 100mg weekly. She can continue the use of ciclopirox cream alternating with clobetasol cream QOD.  She can also try Dermeleve cream for the pain and tenderness.  Part of the issue is that she always has these fingers covered with bandaids because of the tenderness and her concern about contagiousness; this creates a moist environment for yeast overgrowth. She has a nurse come in three times a week for bandaid changes. I recommend that she take off the bandaid the night before each dressing change to let air get to her fingers. Return to clinic in 1 month. Refill given for fluconazole which is written as daily but the patient knows to decrease the dose to weekly.\\n\\nCarry forward from (Mar 13 2024): Left thumb is almost normal and she does not need to bandage this finger anymore.  The right thumb shows signs of proximal clearing and the nail fold is not swollen.  The left index finger is still swollen and sore and the nail is still dystrophic. I will have her stay on fluconazole 100mg weekly. She states that she only has 3 pills of fluconazole left at home so she likely did not  the refill after the last visit; refills were given for fluconazole which is written as daily but the patient knows to decrease the dose to weekly.  She has a nurse come in three times a week for bandaid changes. I recommend that she take off the bandaid the night before each dressing change to let air get to her fingers. Return to clinic in 1 month.\\n\\Claudette's note (Apr 17 2024): The patient still has a very inflamed and tender left index finger despite treatment with fluconazole and topical ciclopirox since 12/21/2023.  The candida onychomycosis has cleared on the left and right thumbs and there is minimal inflammation or pain over these two fingers.  Nail clipping was taken from the left index fingernail for [employer] Nail-ID test.  I suspect that she may have an underlying arthritic condition (gout, psoriatic arthritis, RA) in the left index finger as there is swelling of the DIP joint and she is unable to fully bend this joint.  I recommend that she see a rheumatologist; a referral list is given. I recommend that she start using OTC Voltaren gel on this joint.  I will keep her on fluconazole 100mg weekly for now.  I again advised her to leave her fingers open to the air instead of keeping the fingers covered all the time with bandaids.  This occlusion is causing excessive moisture entrapment which can lead to yeast overgrowth and also cause the nails to be \"mushy\".  Return to clinic in 1 month.

## 2024-05-16 ENCOUNTER — APPOINTMENT (RX ONLY)
Dept: URBAN - METROPOLITAN AREA CLINIC 158 | Facility: CLINIC | Age: 79
Setting detail: DERMATOLOGY
End: 2024-05-16

## 2024-05-16 DIAGNOSIS — L03.01 CELLULITIS OF FINGER: ICD-10-CM

## 2024-05-16 PROBLEM — L03.012 CELLULITIS OF LEFT FINGER: Status: ACTIVE | Noted: 2024-05-16

## 2024-05-16 PROBLEM — L03.011 CELLULITIS OF RIGHT FINGER: Status: ACTIVE | Noted: 2024-05-16

## 2024-05-16 PROCEDURE — 99213 OFFICE O/P EST LOW 20 MIN: CPT

## 2024-05-16 PROCEDURE — ? PRESCRIPTION MEDICATION MANAGEMENT

## 2024-05-16 PROCEDURE — ? PATIENT SPECIFIC COUNSELING

## 2024-05-16 PROCEDURE — ? COUNSELING

## 2024-05-16 ASSESSMENT — LOCATION ZONE DERM: LOCATION ZONE: FINGERNAIL

## 2024-05-16 ASSESSMENT — LOCATION SIMPLE DESCRIPTION DERM
LOCATION SIMPLE: RIGHT THUMBNAIL
LOCATION SIMPLE: LEFT INDEX FINGERNAIL

## 2024-05-16 ASSESSMENT — LOCATION DETAILED DESCRIPTION DERM
LOCATION DETAILED: RIGHT THUMBNAIL
LOCATION DETAILED: LEFT INDEX FINGERNAIL

## 2024-05-16 NOTE — PROCEDURE: PATIENT SPECIFIC COUNSELING
Detail Level: Simple
Carry forward from 12/19/2023: The patient had seen Dr. Dominguez several times for her nails.  She has had this issue for at least 2 months. Her left index finger is extremely sore (10/10 on pain scale). She was prescribed mupirocin 2% ointment on 10/25/2023, doxycycline 100mg BID for 10 days and hydrocortisone 1%-iodoquinol 1% cream on 11/8/2023, and clobetasol 0.05% cream on 12/6/2023.  A culture was taken on 12/6/2023 which grew heavy growth of mixed gram-positive and negative juan c (probable contamination or colonization with cutaneous juan c per culture report). She was not notified of the results. She had used clobetasol cream for only 2 days after the last visit but discontinued the medication because it wasn't helping.  She is currently using a combination of \"prednisone 1% cream\" and mupirocin.  I told her that I am not aware of any \"prednisone cream\" that is commercially available; she states that she will bring in the medication to her next visit. Exam shows dystophic nail on the right thumb, left thumb, and left index with erythema and swelling of the nail folds.  Differential diagnosis include bacterial paronychia, fungal paronychia, nail psoriasis, and nail lichen planus.  Nail clipping was taken today for both pathology and [employer] Nail-ID test.  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  I recommend restarting doxycycline 100mg BID for 2 weeks; will adjust oral antibiotic (or oral antifungal) based upon the test results.  I also recommend that she go back to try clobetasol cream with mupirocin ointment BID.  Return to clinic in 2 weeks.\\n\\nCarry forward from (Jan 04 2024): Nail clipping sent for pathology at the last visit showed onychomycosis with numerous fungal organism and Nail Fungal ID molecular pathogen test detected Candida albicans (91%) and malassezia spp. (9%).  I had notified the patient of the results of the Nail Fungal ID test on 12/21/2023 and prescribed her fluconazole 100mg QD (#30 with no refills) and ciclopirox 0.77% cream.   I also instructed her to discontinue doxycycline and clobetasol cream.  Condition is vastly improved; see photo. I will have her continue on fluconazole 100mg QD adn ciclopirox 0.77% cream QOD.  Return to clinic in 2 weeks. We may need to extend the course of fluconazole at the next visit.\\n\\nCarry forward from (Jan 16 2024): Candidal onychomycosis and paronychia is improved but her fingers are . I recommend extending fluconazole 100mg QD for 1 more month. She should continue the use of ciclopirox cream QOD and I will have her add back clobetasol 0.05% cream QOD which can be applied together.  Return to clinic in 1 month.\\n\\nCarry forward from (Feb 14 2024): Candidal onychomycosis and paronychia continue to improve but her fingers are . The left thumb looks almost normal with minimal erythema of the nail folds. The right thumb and left index sill have erythema over the nail folds and the nails are still dystrophic. I trimmed the nail on the left index finger. I will have her decrease fluconazole to 100mg weekly. She can continue the use of ciclopirox cream alternating with clobetasol cream QOD.  She can also try Dermeleve cream for the pain and tenderness.  Part of the issue is that she always has these fingers covered with bandaids because of the tenderness and her concern about contagiousness; this creates a moist environment for yeast overgrowth. She has a nurse come in three times a week for bandaid changes. I recommend that she take off the bandaid the night before each dressing change to let air get to her fingers. Return to clinic in 1 month. Refill given for fluconazole which is written as daily but the patient knows to decrease the dose to weekly.\\n\\nCarry forward from (Mar 13 2024): Left thumb is almost normal and she does not need to bandage this finger anymore.  The right thumb shows signs of proximal clearing and the nail fold is not swollen.  The left index finger is still swollen and sore and the nail is still dystrophic. I will have her stay on fluconazole 100mg weekly. She states that she only has 3 pills of fluconazole left at home so she likely did not  the refill after the last visit; refills were given for fluconazole which is written as daily but the patient knows to decrease the dose to weekly.  She has a nurse come in three times a week for bandaid changes. I recommend that she take off the bandaid the night before each dressing change to let air get to her fingers. Return to clinic in 1 month.\\n\\nCarry forward from (Apr 17 2024): The patient still has a very inflamed and tender left index finger despite treatment with fluconazole and topical ciclopirox since 12/21/2023.  The candida onychomycosis has cleared on the left and right thumbs and there is minimal inflammation or pain over these two fingers.  Nail clipping was taken from the left index fingernail for [employer] Nail-ID test.  I suspect that she may have an underlying arthritic condition (gout, psoriatic arthritis, RA) in the left index finger as there is swelling of the DIP joint and she is unable to fully bend this joint.  I recommend that she see a rheumatologist; a referral list is given. I recommend that she start using OTC Voltaren gel on this joint.  I will keep her on fluconazole 100mg weekly for now.  I again advised her to leave her fingers open to the air instead of keeping the fingers covered all the time with bandaids.  This occlusion is causing excessive moisture entrapment which can lead to yeast overgrowth and also cause the nails to be \"mushy\".  Return to clinic in 1 month.\\n\\nCarry forward from 4/22/2024 phone conversation: I talked with Mary Kate and notified her of the Nail-ID [employer] [facility] test results. The test did not detect Candida. It did detect Malassezia spp. which is a normal cutaneous juan c in 90% of the population. I told her that her Candida onychomycosis is clear with her being on fluconazole since Dec 2023. The pain in her left index DIP is likely from an underlying arthritic condition (ie. gout, psoriatic arthritis, RA). She has not been able to make an appointment with [facility] because they need our records sent over. I will fax over medical records to [facility].\\n\\nTODATRACIE's note (May 16 2024): She has been off fluconazole since 4/22/2024.  She has been using OTC Voltaren gel topically.  Swelling and erythema over the left index DIP have decreased since the last visit. She still is occluding the left index finger and right thumb with bandaids almost all the time which is leading to the nails being \"mushy\".  I instructed her to leave off the band-aid and let air get to her fingers.  She states that she has an appointment in June with [facility].  Again, I suspect that she may have an underlying arthritic condition (gout, psoriatic arthritis, RA) in the left index finger and possibly her right thumb.  The fungal infection of the nails and the associated paronychia is resolved.  Return to clinic as needed.

## 2024-05-16 NOTE — PROCEDURE: PRESCRIPTION MEDICATION MANAGEMENT
Render In Strict Bullet Format?: No
Detail Level: Simple
Continue Regimen: Volatren gel
Plan: Leave fingers open to air\\[place] see rheumatology.
Initiate Treatment: Voltaren gel

## 2025-05-01 ENCOUNTER — RX ONLY (RX ONLY)
Age: 80
End: 2025-05-01

## 2025-05-01 RX ORDER — CICLOPIROX OLAMINE 7.7 MG/G
1 CREAM TOPICAL BID
Qty: 30 | Refills: 12 | Status: ERX